# Patient Record
Sex: FEMALE | Race: BLACK OR AFRICAN AMERICAN | URBAN - METROPOLITAN AREA
[De-identification: names, ages, dates, MRNs, and addresses within clinical notes are randomized per-mention and may not be internally consistent; named-entity substitution may affect disease eponyms.]

---

## 2021-06-28 ENCOUNTER — LAB OUTSIDE AN ENCOUNTER (OUTPATIENT)
Dept: URBAN - METROPOLITAN AREA CLINIC 115 | Facility: CLINIC | Age: 66
End: 2021-06-28

## 2021-06-28 ENCOUNTER — WEB ENCOUNTER (OUTPATIENT)
Dept: URBAN - METROPOLITAN AREA CLINIC 115 | Facility: CLINIC | Age: 66
End: 2021-06-28

## 2021-06-28 ENCOUNTER — OFFICE VISIT (OUTPATIENT)
Dept: URBAN - METROPOLITAN AREA CLINIC 115 | Facility: CLINIC | Age: 66
End: 2021-06-28
Payer: MEDICARE

## 2021-06-28 VITALS
DIASTOLIC BLOOD PRESSURE: 83 MMHG | HEIGHT: 67 IN | HEART RATE: 85 BPM | WEIGHT: 215 LBS | SYSTOLIC BLOOD PRESSURE: 136 MMHG | TEMPERATURE: 97.3 F | BODY MASS INDEX: 33.74 KG/M2

## 2021-06-28 DIAGNOSIS — B19.10 HEPATITIS B: ICD-10-CM

## 2021-06-28 DIAGNOSIS — Z12.11 COLON CANCER SCREENING: ICD-10-CM

## 2021-06-28 PROCEDURE — 99214 OFFICE O/P EST MOD 30 MIN: CPT | Performed by: INTERNAL MEDICINE

## 2021-06-28 NOTE — HPI-TODAY'S VISIT:
for colonoscopy,  no symptoms , bm regular, no bleeding, no wt loss, no abdominal pan  none medication  no heart or lung issue.  no fhcc/fhcp

## 2021-07-07 ENCOUNTER — LAB OUTSIDE AN ENCOUNTER (OUTPATIENT)
Dept: URBAN - METROPOLITAN AREA CLINIC 82 | Facility: CLINIC | Age: 66
End: 2021-07-07

## 2021-07-09 LAB
A/G RATIO: 1.4
AFP, SERUM: 1.9
AFP-L3%, SERUM: 39.2
ALBUMIN: 4.4
ALKALINE PHOSPHATASE: 66
AST (SGOT): 19
BASO (ABSOLUTE): 0.1
BASOS: 1
BILIRUBIN, TOTAL: 0.3
BUN/CREATININE RATIO: 25
BUN: 17
CALCIUM: 9.6
CARBON DIOXIDE, TOTAL: 23
CHLORIDE: 105
CREATININE: 0.68
EGFR IF AFRICN AM: 105
EGFR IF NONAFRICN AM: 91
EOS (ABSOLUTE): 0.1
EOS: 2
GLOBULIN, TOTAL: 3.2
GLUCOSE: 92
HBSAG SCREEN: NEGATIVE
HBV LOG10: (no result)
HEMATOCRIT: 46.1
HEMATOLOGY COMMENTS:: (no result)
HEMOGLOBIN: 13.8
HEP BE AB: POSITIVE
HEP BE AG: NEGATIVE
HEPATITIS B QUANTITATION: (no result)
HEPATITIS B SURF AB QUANT: 4.7
IMMATURE CELLS: (no result)
IMMATURE GRANS (ABS): 0
IMMATURE GRANULOCYTES: 0
LYMPHS (ABSOLUTE): 2.9
LYMPHS: 55
MCH: 24.4
MCHC: 29.9
MCV: 82
MONOCYTES(ABSOLUTE): 0.6
MONOCYTES: 11
NEUTROPHILS (ABSOLUTE): 1.6
NEUTROPHILS: 31
NRBC: (no result)
PLATELETS: 306
POTASSIUM: 4.3
PROTEIN, TOTAL: 7.6
RBC: 5.65
RDW: 13.8
SODIUM: 140
TEST INFORMATION:: (no result)
WBC: 5.2

## 2021-07-19 ENCOUNTER — OFFICE VISIT (OUTPATIENT)
Dept: URBAN - METROPOLITAN AREA SURGERY CENTER 13 | Facility: SURGERY CENTER | Age: 66
End: 2021-07-19
Payer: MEDICARE

## 2021-07-19 DIAGNOSIS — D12.0 ADENOMA OF CECUM: ICD-10-CM

## 2021-07-19 DIAGNOSIS — Z12.11 COLON CANCER SCREENING: ICD-10-CM

## 2021-07-19 DIAGNOSIS — K63.89 BACTERIAL OVERGROWTH SYNDROME: ICD-10-CM

## 2021-07-19 PROCEDURE — G8907 PT DOC NO EVENTS ON DISCHARG: HCPCS | Performed by: INTERNAL MEDICINE

## 2021-07-19 PROCEDURE — 45385 COLONOSCOPY W/LESION REMOVAL: CPT | Performed by: INTERNAL MEDICINE

## 2021-08-30 ENCOUNTER — OFFICE VISIT (OUTPATIENT)
Dept: URBAN - METROPOLITAN AREA CLINIC 115 | Facility: CLINIC | Age: 66
End: 2021-08-30

## 2021-10-11 ENCOUNTER — WEB ENCOUNTER (OUTPATIENT)
Dept: URBAN - METROPOLITAN AREA CLINIC 115 | Facility: CLINIC | Age: 66
End: 2021-10-11

## 2021-10-11 ENCOUNTER — OFFICE VISIT (OUTPATIENT)
Dept: URBAN - METROPOLITAN AREA CLINIC 115 | Facility: CLINIC | Age: 66
End: 2021-10-11
Payer: MEDICARE

## 2021-10-11 VITALS
HEIGHT: 67 IN | HEART RATE: 81 BPM | BODY MASS INDEX: 33.06 KG/M2 | DIASTOLIC BLOOD PRESSURE: 81 MMHG | WEIGHT: 210.6 LBS | SYSTOLIC BLOOD PRESSURE: 133 MMHG | TEMPERATURE: 97.2 F

## 2021-10-11 DIAGNOSIS — B19.10 HEPATITIS B: ICD-10-CM

## 2021-10-11 DIAGNOSIS — K76.9 LIVER LESION, RIGHT LOBE: ICD-10-CM

## 2021-10-11 DIAGNOSIS — D12.6 ADENOMATOUS POLYP OF COLON, UNSPECIFIED PART OF COLON: ICD-10-CM

## 2021-10-11 PROBLEM — 428054006: Status: ACTIVE | Noted: 2021-10-11

## 2021-10-11 PROCEDURE — 99213 OFFICE O/P EST LOW 20 MIN: CPT | Performed by: INTERNAL MEDICINE

## 2022-01-11 ENCOUNTER — LAB OUTSIDE AN ENCOUNTER (OUTPATIENT)
Dept: URBAN - METROPOLITAN AREA CLINIC 115 | Facility: CLINIC | Age: 67
End: 2022-01-11

## 2022-04-02 ENCOUNTER — LAB OUTSIDE AN ENCOUNTER (OUTPATIENT)
Dept: URBAN - METROPOLITAN AREA CLINIC 115 | Facility: CLINIC | Age: 67
End: 2022-04-02

## 2022-04-11 ENCOUNTER — LAB OUTSIDE AN ENCOUNTER (OUTPATIENT)
Dept: URBAN - METROPOLITAN AREA CLINIC 115 | Facility: CLINIC | Age: 67
End: 2022-04-11

## 2022-04-11 ENCOUNTER — OFFICE VISIT (OUTPATIENT)
Dept: URBAN - METROPOLITAN AREA CLINIC 115 | Facility: CLINIC | Age: 67
End: 2022-04-11

## 2022-05-17 LAB — AFP, SERUM, TUMOR MARKER: 1.3

## 2022-05-19 ENCOUNTER — OFFICE VISIT (OUTPATIENT)
Dept: URBAN - METROPOLITAN AREA CLINIC 114 | Facility: CLINIC | Age: 67
End: 2022-05-19
Payer: MEDICARE

## 2022-05-19 DIAGNOSIS — B19.10 HEP B W/O COMA: ICD-10-CM

## 2022-05-19 PROCEDURE — 76705 ECHO EXAM OF ABDOMEN: CPT | Performed by: INTERNAL MEDICINE

## 2022-06-27 ENCOUNTER — OFFICE VISIT (OUTPATIENT)
Dept: URBAN - METROPOLITAN AREA CLINIC 115 | Facility: CLINIC | Age: 67
End: 2022-06-27

## 2022-09-07 ENCOUNTER — OFFICE VISIT (OUTPATIENT)
Dept: URBAN - METROPOLITAN AREA CLINIC 115 | Facility: CLINIC | Age: 67
End: 2022-09-07
Payer: MEDICARE

## 2022-09-07 VITALS
DIASTOLIC BLOOD PRESSURE: 76 MMHG | WEIGHT: 202 LBS | HEIGHT: 67 IN | BODY MASS INDEX: 31.71 KG/M2 | HEART RATE: 87 BPM | TEMPERATURE: 97.7 F | SYSTOLIC BLOOD PRESSURE: 134 MMHG

## 2022-09-07 DIAGNOSIS — D12.6 ADENOMATOUS POLYP OF COLON, UNSPECIFIED PART OF COLON: ICD-10-CM

## 2022-09-07 DIAGNOSIS — K76.9 LIVER LESION, RIGHT LOBE: ICD-10-CM

## 2022-09-07 DIAGNOSIS — B19.10 HEPATITIS B: ICD-10-CM

## 2022-09-07 PROBLEM — 300331000: Status: ACTIVE | Noted: 2021-10-11

## 2022-09-07 PROCEDURE — 99213 OFFICE O/P EST LOW 20 MIN: CPT | Performed by: INTERNAL MEDICINE

## 2022-09-08 LAB
AFP, SERUM, TUMOR MARKER: 2.2
ALBUMIN/GLOBULIN RATIO: 1.2
ALBUMIN: 4.1
ALKALINE PHOSPHATASE: 67
ALT (SGPT): 12
AST (SGOT): 17
BILIRUBIN, DIRECT: 0.1
BILIRUBIN, INDIRECT: 0.2
BILIRUBIN, TOTAL: 0.3
GLOBULIN: 3.5
HEPATITIS B SURFACE AB IMMUNITY, QN: 6
HEPATITIS B SURFACE ANTIGEN: (no result)
PROTEIN, TOTAL: 7.6

## 2024-03-04 ENCOUNTER — LAB (OUTPATIENT)
Dept: URBAN - METROPOLITAN AREA CLINIC 115 | Facility: CLINIC | Age: 69
End: 2024-03-04

## 2024-03-04 ENCOUNTER — OV EP (OUTPATIENT)
Dept: URBAN - METROPOLITAN AREA CLINIC 115 | Facility: CLINIC | Age: 69
End: 2024-03-04
Payer: MEDICARE

## 2024-03-04 VITALS
BODY MASS INDEX: 32.3 KG/M2 | HEIGHT: 67 IN | DIASTOLIC BLOOD PRESSURE: 91 MMHG | WEIGHT: 205.8 LBS | HEART RATE: 65 BPM | SYSTOLIC BLOOD PRESSURE: 149 MMHG | TEMPERATURE: 97.6 F

## 2024-03-04 DIAGNOSIS — B19.10 HEPATITIS B: ICD-10-CM

## 2024-03-04 DIAGNOSIS — D12.6 ADENOMATOUS POLYP OF COLON, UNSPECIFIED PART OF COLON: ICD-10-CM

## 2024-03-04 DIAGNOSIS — K76.9 LIVER LESION, RIGHT LOBE: ICD-10-CM

## 2024-03-04 PROCEDURE — 99214 OFFICE O/P EST MOD 30 MIN: CPT | Performed by: INTERNAL MEDICINE

## 2024-03-04 NOTE — HPI-TODAY'S VISIT:
FOLOW UP ON HEP B, ADV TO COME EVERY 6 MONTHS TO YEAR  NO ISSUE, NO ETOH,   NO FH OF HEP B,  FROM [place]

## 2024-03-08 LAB
AFP, SERUM: 1.9
AFP-L3%, SERUM: 48.2
ALBUMIN/GLOBULIN RATIO: 1.3
ALBUMIN: 4.1
ALKALINE PHOSPHATASE: 60
ALT (SGPT): 14
AST (SGOT): 17
BILIRUBIN, DIRECT: 0.1
BILIRUBIN, INDIRECT: 0.2
BILIRUBIN, TOTAL: 0.3
GLOBULIN: 3.2
HBV LOG10: NOT DETECTED
HEP BE AB: REACTIVE
HEP BE AG: (no result)
HEPATITIS B SURFACE AB IMMUNITY, QN: 5
HEPATITIS B SURFACE ANTIGEN: (no result)
HEPATITIS B VIRUS DNA: NOT DETECTED
PROTEIN, TOTAL: 7.3

## 2024-03-13 ENCOUNTER — LAB (OUTPATIENT)
Dept: URBAN - METROPOLITAN AREA CLINIC 115 | Facility: CLINIC | Age: 69
End: 2024-03-13

## 2024-03-25 ENCOUNTER — OV EP (OUTPATIENT)
Dept: URBAN - METROPOLITAN AREA CLINIC 115 | Facility: CLINIC | Age: 69
End: 2024-03-25
Payer: MEDICARE

## 2024-03-25 VITALS
WEIGHT: 204.8 LBS | DIASTOLIC BLOOD PRESSURE: 85 MMHG | SYSTOLIC BLOOD PRESSURE: 136 MMHG | HEART RATE: 78 BPM | TEMPERATURE: 97.4 F | HEIGHT: 67 IN | BODY MASS INDEX: 32.15 KG/M2

## 2024-03-25 DIAGNOSIS — K76.89 HEPATIC CYST: ICD-10-CM

## 2024-03-25 DIAGNOSIS — B18.1 CHRONIC HEPATITIS B: ICD-10-CM

## 2024-03-25 DIAGNOSIS — K42.9 UMBILICAL HERNIA WITHOUT OBSTRUCTION AND WITHOUT GANGRENE: ICD-10-CM

## 2024-03-25 PROBLEM — 85057007: Status: ACTIVE | Noted: 2024-03-25

## 2024-03-25 PROBLEM — 61977001: Status: ACTIVE | Noted: 2024-03-25

## 2024-03-25 PROBLEM — 396347007: Status: ACTIVE | Noted: 2024-03-25

## 2024-03-25 PROCEDURE — 99213 OFFICE O/P EST LOW 20 MIN: CPT

## 2024-03-25 NOTE — PHYSICAL EXAM SKIN:
no jaundice present
CONSTITUTIONAL: In no apparent distress.   HEENMT: Airway patent, TM normal bilaterally, normal appearing mouth, nose, and throat. No cervical adenopathy. Flat anterior fontanelles  EYES:  Eyes are clear bilaterally  CARDIAC: Regular rate and rhythm, Heart sounds S1 S2 present, no murmurs, rubs or gallops  RESPIRATORY: No respiratory distress. No stridor, Lungs sounds clear with good aeration bilaterally.  GASTROINTESTINAL: Abdomen soft, non-tender and non-distended, no rebound, no guarding and no masses. no hepatosplenomegaly.  MUSCULOSKELETAL:  Movement of extremities grossly intact.  NEUROLOGICAL: Alert and interactive  SKIN: No cyanosis, no pallor, no jaundice, no rash

## 2024-03-25 NOTE — HPI-TODAY'S VISIT:
3/4/24 with Dr. Camp: FOLOW UP ON HEP B, ADV TO COME EVERY 6 MONTHS TO YEAR NO ISSUE, NO ETOH, NO FH OF HEP B,  FROM [place] 3/25/24 today visit: Since last visit, pt had labs that showed eAb positive (eAg neg), AFP WNL but AFP L3% high, DNA is not detected, ALT 14. MRI showed 2 right hepatic lobe cysts (one 7x5mm and the other 11x8mm). Denies abd pain, n/v, jaundice. 5/2022 US showed gallstones only; liver was normal. Last COL in 2021 showed hemorrhoids, IC valve congestion, one TA; repeat in 5 yrs (2026).

## 2024-09-23 ENCOUNTER — OFFICE VISIT (OUTPATIENT)
Dept: URBAN - METROPOLITAN AREA CLINIC 115 | Facility: CLINIC | Age: 69
End: 2024-09-23

## 2024-10-16 ENCOUNTER — DASHBOARD ENCOUNTERS (OUTPATIENT)
Age: 69
End: 2024-10-16

## 2024-10-16 ENCOUNTER — OFFICE VISIT (OUTPATIENT)
Dept: URBAN - METROPOLITAN AREA CLINIC 115 | Facility: CLINIC | Age: 69
End: 2024-10-16
Payer: COMMERCIAL

## 2024-10-16 ENCOUNTER — LAB OUTSIDE AN ENCOUNTER (OUTPATIENT)
Dept: URBAN - METROPOLITAN AREA CLINIC 115 | Facility: CLINIC | Age: 69
End: 2024-10-16

## 2024-10-16 VITALS
WEIGHT: 205.2 LBS | TEMPERATURE: 97.1 F | HEIGHT: 67 IN | BODY MASS INDEX: 32.21 KG/M2 | DIASTOLIC BLOOD PRESSURE: 76 MMHG | SYSTOLIC BLOOD PRESSURE: 129 MMHG | HEART RATE: 75 BPM

## 2024-10-16 DIAGNOSIS — B19.10 HEPATITIS B: ICD-10-CM

## 2024-10-16 PROCEDURE — 99213 OFFICE O/P EST LOW 20 MIN: CPT

## 2024-10-16 NOTE — HPI-TODAY'S VISIT:
68 y/o F withPMH of chronic hep B presents for f/u. 3/4/24 labs showed eAb positive (eAg neg), AFP WNL but AFP L3% high, DNA is not detected, ALT 14. MRI showed 2 right hepatic lobe cysts (one 7x5mm and the other 11x8mm).  5/2022 US showed gallstones only; liver was normal. Last COL in 2021 with Dr. Camp showed hemorrhoids, IC valve congestion, one TA; repeat in 5 yrs (2026). Denies abd pain, n/v, jaundice, icterus, EtOH, FHx of Hep B. Labs from 7/2024 showed high lipids, Hgb 14.7,MCV 84.7, a1c 6,normal CMP, TSH 0.39 (low, prev normal range in 5/2024). Has endocrinology following her TSH, which she states is returning back to normal.

## 2024-10-23 ENCOUNTER — TELEPHONE ENCOUNTER (OUTPATIENT)
Dept: URBAN - METROPOLITAN AREA CLINIC 115 | Facility: CLINIC | Age: 69
End: 2024-10-23

## 2024-10-23 LAB
AFP, SERUM: 1.6
AFP-L3%, SERUM: 50.8
ALBUMIN/GLOBULIN RATIO: 1.2
ALBUMIN: 4.1
ALKALINE PHOSPHATASE: 66
ALT: 11
AST: 16
BILIRUBIN, DIRECT: 0.1
BILIRUBIN, INDIRECT: 0.2
BILIRUBIN, TOTAL: 0.3
FIB 4 INDEX: 1.17
FIB 4 INTERPRETATION: (no result)
GLOBULIN: 3.3
HBV LOG10: NOT DETECTED
HEP B CORE AB, IGM: (no result)
HEP BE AB: REACTIVE
HEP BE AG: (no result)
HEPATITIS B SURFACE AB IMMUNITY, QN: 6
HEPATITIS B SURFACE ANTIGEN: (no result)
HEPATITIS B VIRUS DNA: NOT DETECTED
PLATELET COUNT: 288
PROTEIN, TOTAL: 7.4

## 2024-11-21 ENCOUNTER — OFFICE VISIT (OUTPATIENT)
Dept: URBAN - METROPOLITAN AREA CLINIC 114 | Facility: CLINIC | Age: 69
End: 2024-11-21
Payer: COMMERCIAL

## 2024-11-21 DIAGNOSIS — B19.10 HEPATITIS B: ICD-10-CM

## 2024-11-21 PROCEDURE — 76705 ECHO EXAM OF ABDOMEN: CPT | Performed by: INTERNAL MEDICINE

## 2025-04-16 ENCOUNTER — OFFICE VISIT (OUTPATIENT)
Dept: URBAN - METROPOLITAN AREA CLINIC 115 | Facility: CLINIC | Age: 70
End: 2025-04-16